# Patient Record
Sex: MALE | Race: ASIAN | NOT HISPANIC OR LATINO | URBAN - METROPOLITAN AREA
[De-identification: names, ages, dates, MRNs, and addresses within clinical notes are randomized per-mention and may not be internally consistent; named-entity substitution may affect disease eponyms.]

---

## 2021-04-16 ENCOUNTER — EMERGENCY (EMERGENCY)
Facility: HOSPITAL | Age: 31
LOS: 1 days | Discharge: HOME | End: 2021-04-16
Attending: EMERGENCY MEDICINE
Payer: SELF-PAY

## 2021-04-16 VITALS
OXYGEN SATURATION: 99 % | RESPIRATION RATE: 16 BRPM | WEIGHT: 154.32 LBS | SYSTOLIC BLOOD PRESSURE: 167 MMHG | TEMPERATURE: 99 F | HEART RATE: 101 BPM | DIASTOLIC BLOOD PRESSURE: 104 MMHG | HEIGHT: 67 IN

## 2021-04-16 PROCEDURE — 99283 EMERGENCY DEPT VISIT LOW MDM: CPT

## 2021-04-16 RX ORDER — CIPROFLOXACIN AND DEXAMETHASONE 3; 1 MG/ML; MG/ML
4 SUSPENSION/ DROPS AURICULAR (OTIC)
Qty: 1 | Refills: 0
Start: 2021-04-16 | End: 2021-04-22

## 2021-04-16 NOTE — ED PROVIDER NOTE - NSFOLLOWUPCLINICS_GEN_ALL_ED_FT
[facility] General Internal Medicine  General Internal Medicine  [address] [id number]  Phone: [phone]  Fax:   Follow Up Time: 1-3 Days

## 2021-04-16 NOTE — ED ADULT NURSE NOTE - OBJECTIVE STATEMENT
38 yr old male amb to ED c/o l ear pain and swelling Denies fever or chills Denies trauma Assessed per PA.

## 2021-04-16 NOTE — ED POST DISCHARGE NOTE - ADDITIONAL DOCUMENTATION
4/16/21: Pt called, currently at pharmacy and no RX. I reviewed pt chart and confirmed preferred pharmacy, Rx printed not esubmitted. I esubmitted Rx as written by primary ED team. -Mayo Hopkins PA-C

## 2021-04-16 NOTE — ED PROVIDER NOTE - CLINICAL SUMMARY MEDICAL DECISION MAKING FREE TEXT BOX
Patient with history and physical c/w uncomplicated otitis externa.  Will prescribe medication and pt stable for outpatient treatment and f/u care.

## 2021-04-16 NOTE — ED PROVIDER NOTE - ATTENDING CONTRIBUTION TO CARE
I, Andre Nelson, performed a history and physical exam of the patient and discussed their management with the resident and /or advanced care provider. I reviewed the resident and /or ACP's note and agree with the documented findings and plan of care. I was present and available for all procedures.  Patient with history and physical c/w otitis externa.  Patient stable for outpatient treatment.

## 2021-04-16 NOTE — ED PROVIDER NOTE - OBJECTIVE STATEMENT
37 yo M with no pertinent pmhx presenting with left ear pain x 2-3days. Patient states for the past couple of days he has been having lightheadedness, ha, and ear pain that radiates to his head. Patient hasn't taken anything for the pain. Patient denies chest pain, sob, sore throat, nasal congestion, cough, fever, tingling, numbness, weakness, blurred vision, and abd pain

## 2021-04-16 NOTE — ED PROVIDER NOTE - NSFOLLOWUPINSTRUCTIONS_ED_ALL_ED_FT
Rest and hydration  motrin 600mg every 6 hours for pain as needed.   ciprodex drops four drops in left ear twice a day for one week  Stop smoking  Follow up with primary doctor  Return to the ER Immediately for worsening symptoms

## 2021-04-16 NOTE — ED PROVIDER NOTE - PATIENT PORTAL LINK FT
You can access the FollowMyHealth Patient Portal offered by [facility] by registering at the following website: [email or web address]. By joining [employer]’s FollowMyHealth portal, you will also be able to view your health information using other applications (apps) compatible with our system.